# Patient Record
Sex: FEMALE | Race: OTHER | HISPANIC OR LATINO | URBAN - METROPOLITAN AREA
[De-identification: names, ages, dates, MRNs, and addresses within clinical notes are randomized per-mention and may not be internally consistent; named-entity substitution may affect disease eponyms.]

---

## 2018-03-12 ENCOUNTER — EMERGENCY (EMERGENCY)
Facility: HOSPITAL | Age: 58
LOS: 1 days | Discharge: HOME | End: 2018-03-12
Attending: EMERGENCY MEDICINE | Admitting: EMERGENCY MEDICINE
Payer: MEDICAID

## 2018-03-12 VITALS
TEMPERATURE: 98 F | RESPIRATION RATE: 18 BRPM | SYSTOLIC BLOOD PRESSURE: 175 MMHG | HEART RATE: 92 BPM | DIASTOLIC BLOOD PRESSURE: 107 MMHG

## 2018-03-12 VITALS
RESPIRATION RATE: 18 BRPM | HEART RATE: 89 BPM | OXYGEN SATURATION: 100 % | SYSTOLIC BLOOD PRESSURE: 156 MMHG | DIASTOLIC BLOOD PRESSURE: 89 MMHG

## 2018-03-12 DIAGNOSIS — Z98.890 OTHER SPECIFIED POSTPROCEDURAL STATES: Chronic | ICD-10-CM

## 2018-03-12 PROBLEM — Z00.00 ENCOUNTER FOR PREVENTIVE HEALTH EXAMINATION: Status: ACTIVE | Noted: 2018-03-12

## 2018-03-12 LAB
APTT BLD: 27.4 SEC — LOW (ref 27.5–37.4)
HCT VFR BLD CALC: 39.8 % — SIGNIFICANT CHANGE UP (ref 34.5–45)
HGB BLD-MCNC: 13.3 G/DL — SIGNIFICANT CHANGE UP (ref 11.5–15.5)
INR BLD: 0.98 RATIO — SIGNIFICANT CHANGE UP (ref 0.88–1.16)
MCHC RBC-ENTMCNC: 28.6 PG — SIGNIFICANT CHANGE UP (ref 27–34)
MCHC RBC-ENTMCNC: 33.4 GM/DL — SIGNIFICANT CHANGE UP (ref 32–36)
MCV RBC AUTO: 85.7 FL — SIGNIFICANT CHANGE UP (ref 80–100)
PLATELET # BLD AUTO: 171 K/UL — SIGNIFICANT CHANGE UP (ref 150–400)
PROTHROM AB SERPL-ACNC: 10.7 SEC — SIGNIFICANT CHANGE UP (ref 9.8–12.7)
RBC # BLD: 4.64 M/UL — SIGNIFICANT CHANGE UP (ref 3.8–5.2)
RBC # FLD: 12.9 % — SIGNIFICANT CHANGE UP (ref 10.3–14.5)
WBC # BLD: 5.2 K/UL — SIGNIFICANT CHANGE UP (ref 3.8–10.5)
WBC # FLD AUTO: 5.2 K/UL — SIGNIFICANT CHANGE UP (ref 3.8–10.5)

## 2018-03-12 PROCEDURE — 85610 PROTHROMBIN TIME: CPT

## 2018-03-12 PROCEDURE — 85730 THROMBOPLASTIN TIME PARTIAL: CPT

## 2018-03-12 PROCEDURE — 85027 COMPLETE CBC AUTOMATED: CPT

## 2018-03-12 PROCEDURE — 99284 EMERGENCY DEPT VISIT MOD MDM: CPT

## 2018-03-12 PROCEDURE — 99283 EMERGENCY DEPT VISIT LOW MDM: CPT

## 2018-03-12 RX ORDER — PHENYLEPHRINE HCL 0.25 %
1 AEROSOL, SPRAY WITH PUMP (ML) NASAL ONCE
Qty: 0 | Refills: 0 | Status: COMPLETED | OUTPATIENT
Start: 2018-03-12 | End: 2018-03-12

## 2018-03-12 RX ADMIN — Medication 1 SPRAY(S): at 19:22

## 2018-03-12 NOTE — ED PROVIDER NOTE - PROGRESS NOTE DETAILS
Pt seen at bedside, sitting and eating, nosebleed resolved.  Dizziness and lightheadedness resolved with food.  Nares patent, no active bleeding seen.  H/H and coags wnl.  Pt instructed to f/u with ENT.

## 2018-03-12 NOTE — ED PROVIDER NOTE - OBJECTIVE STATEMENT
58yo F w PMH of HTN, DM, osteoarthritis, eye cataracts, and HLD p/w recurrent epistaxis since last week.  Pt states occurred once on Wed, Sat, and Sun, and then this morning, 3 episodes, followed by 2 hours now.  Pt currently bleeding now.  Other episodes resolved spontaneously w some pressure within 15-20 minutes.  Pt states has had nosebleeds once 20 years ago, hx of menorrhagia, and hx of profuse bleeding s/p dental work, never worked up for bleeding.  Pt endorses dizziness and lightheadedness currently.  Pt states she is chronically SOB, denies CP, HA.    PMH: see above  PSH: hysterectomy  Meds: pravastatin, travatan, pilocarpin, unsure what she takes for DM possible metformin, micardis (telmisartan), trabatol? for htn, ASA occasionally, not in the past few days  Allergies: NKDA  Social: denies T/E/D 56yo F w PMH of HTN, DM, osteoarthritis, eye cataracts, and HLD p/w recurrent epistaxis since last week.  Pt states occurred once on Wed, Sat, and Sun, and then this morning, 3 episodes, followed by 2 hours now.  Pt currently bleeding now.  Other episodes resolved spontaneously w some pressure within 15-20 minutes.  Pt states has had nosebleeds once 20 years ago, hx of menorrhagia, and hx of profuse bleeding s/p dental work, never worked up for bleeding.  Pt endorses dizziness and lightheadedness currently.  Pt states she is chronically SOB, denies CP, HA.    PMH: see above  PSH: hysterectomy  Meds: pravastatin, travatan, pilocarpin, unsure what she takes for DM possible metformin, micardis (telmisartan), trabatol? for htn, ASA occasionally, not in the past few days  Allergies: NKDA  Social: denies T/E/D    Attendinyo female presents with nosebleed from the right nare for about 2 hours.  Pt has having intermittent nosebleeds for about 2 weeks.  Today was on a flight from [place].  No trauma.  no blood thinners.

## 2018-03-12 NOTE — ED PROVIDER NOTE - PLAN OF CARE
Please follow-up with your PCP and ENT within the week.  Please stay hydrated, avoid blowing your nose or trauma to the nose.  If nosebleeding reoccurs before your outpatient doctor's visit and holding pressure does not stop the bleeding, please use two sprays of the neosynephrine.  If bleeding continues, please call your PCP or go to the nearest emergency room.

## 2018-03-12 NOTE — ED ADULT TRIAGE NOTE - CHIEF COMPLAINT QUOTE
intermittent nosebleed x the passed few days, now bleeding constantly over the passed hour, not on any blood thinners, just arrived off flight from [place].

## 2018-03-12 NOTE — ED ADULT NURSE REASSESSMENT NOTE - NS ED NURSE REASSESS COMMENT FT1
Received report from [facility]. Pt sitting on assigned stretcher shows no signs of any distress, VS WNL. Awaiting proper disposition. Continue monitor.

## 2018-03-12 NOTE — ED PROVIDER NOTE - ENMT, MLM
Airway patent, minimal bright red blood from R nostril. Mouth with normal mucosa. Throat has no vesicles, no oropharyngeal exudates, no petechiae, and uvula is midline.

## 2018-03-12 NOTE — ED PROVIDER NOTE - CARE PLAN
Principal Discharge DX:	Epistaxis, recurrent  Assessment and plan of treatment:	Please follow-up with your PCP and ENT within the week.  Please stay hydrated, avoid blowing your nose or trauma to the nose.  If nosebleeding reoccurs before your outpatient doctor's visit and holding pressure does not stop the bleeding, please use two sprays of the neosynephrine.  If bleeding continues, please call your PCP or go to the nearest emergency room.

## 2018-03-12 NOTE — ED PROVIDER NOTE - MEDICAL DECISION MAKING DETAILS
A/P: 56yo w PMH of HTN, HLD, DM, osteoarthritis, eye cataracts, menorrhagia, and bleeding after dental surgery w no work-up presenting w epistaxis.  - hold pressure, ice pack, neosynephrine  - f/u CBC, coags, will continue to monitor    REENA Dave, PGY1

## 2018-03-12 NOTE — ED ADULT NURSE NOTE - OBJECTIVE STATEMENT
57 yr old female amb to ed c/o epistaxis rt nostril off and on since yesterday. Denies sob or chest pain Resp even and nonlab c/o dizziness. Cap refill wnl Conjunctiva pink Skin turg normal Denies abd pain Denies N/V Denies trauma Denies blood thinners

## 2019-08-04 ENCOUNTER — EMERGENCY (EMERGENCY)
Facility: HOSPITAL | Age: 59
LOS: 1 days | Discharge: HOME | End: 2019-08-04
Attending: EMERGENCY MEDICINE
Payer: MEDICAID

## 2019-08-04 VITALS
DIASTOLIC BLOOD PRESSURE: 84 MMHG | RESPIRATION RATE: 20 BRPM | OXYGEN SATURATION: 96 % | TEMPERATURE: 99 F | HEART RATE: 86 BPM | SYSTOLIC BLOOD PRESSURE: 149 MMHG

## 2019-08-04 VITALS
OXYGEN SATURATION: 95 % | SYSTOLIC BLOOD PRESSURE: 175 MMHG | RESPIRATION RATE: 22 BRPM | HEART RATE: 91 BPM | TEMPERATURE: 98 F | DIASTOLIC BLOOD PRESSURE: 77 MMHG

## 2019-08-04 DIAGNOSIS — Z98.890 OTHER SPECIFIED POSTPROCEDURAL STATES: Chronic | ICD-10-CM

## 2019-08-04 PROCEDURE — 99283 EMERGENCY DEPT VISIT LOW MDM: CPT

## 2019-08-04 PROCEDURE — 99282 EMERGENCY DEPT VISIT SF MDM: CPT

## 2019-08-04 PROCEDURE — 94640 AIRWAY INHALATION TREATMENT: CPT

## 2019-08-04 RX ORDER — FLUTICASONE PROPIONATE 50 MCG
2 SPRAY, SUSPENSION NASAL
Refills: 0 | Status: DISCONTINUED | OUTPATIENT
Start: 2019-08-04 | End: 2019-08-09

## 2019-08-04 RX ADMIN — Medication 2 SPRAY(S): at 21:57

## 2019-08-04 NOTE — ED ADULT TRIAGE NOTE - CHIEF COMPLAINT QUOTE
sinus congestion, sore throat, difficulty swallowing. took 1 benedryl 1 hour ago. no tongue swelling/facial edema. Hoarse voice noted.

## 2019-08-04 NOTE — ED PROVIDER NOTE - NSFOLLOWUPINSTRUCTIONS_ED_ALL_ED_FT
Follow up with your Primary Care Physician within the next 2-3 days  Bring a copy of your test results with you to your appointment  Continue your current medication regimen  Return to the Emergency Room if you experience new or worsening symptoms  use nasal spray in each nostril : 2 sprays each nostril 2x per day  Follow up with allergist

## 2019-08-04 NOTE — ED PROVIDER NOTE - NSFOLLOWUPCLINICS_GEN_ALL_ED_FT
[facility]  Allergy  [address] [place] [address]  Phone: [phone]  Fax:   Follow Up Time:

## 2019-08-04 NOTE — ED PROVIDER NOTE - ATTENDING CONTRIBUTION TO CARE
Attending MD Coley:   I personally have seen and examined this patient.  Physician assistant note reviewed and agree on plan of care and except where noted.  See below for details.     Seen in [facility]    Milind went to visit someone and she felt a strong smell at around 530pm.  Reports felt shortness of breath at that time.  Reports was drinking water and reports that if she did not drink water felt as if her throat was closing.  Milind sat in front of the AC and then feels like she started to sneeze.  Reports throat pain.  Milind was given Benadryl by someone at that time.  Reports also had a new fruit prior to going to visit this person was given to her my her , does not know what fruit (showed picture of jackfruit).  Denies sick contacts.  Denies recent travel.  Denies abdominal pain, nausea, vomiting, diarrhea, blood in stools. Denies dysuria, hematuria, change in urinary habits including frequency, urgency. Denies other allergy, reports "sometimes I'm allergy to air".  Denies medication or food allergy.  Denies rash.      TO BE COMPLETED Attending MD Coley:   I personally have seen and examined this patient.  Physician assistant note reviewed and agree on plan of care and except where noted.  See below for details.     Seen in [facility], accompanied by daughter, interviewed in Mongolian    58F with PMH/PSH including DM, HTN,  HLD, osteoarthritis presents to the ED with "an allergy".  Reports went to visit someone and she felt a strong smell at their place around 530pm.  Reports felt shortness of breath at that time.  Reports was drinking water and reports that if she did not drink water felt as if her throat was closing.  Reports sat in front of the AC and then feels like she started to sneeze.  Reports throat pain.  Reports was given Benadryl by someone at that time.  Reports also had a new fruit prior to going to visit this person was given to her my her , does not know what fruit (showed picture of jackfruit).  Daughter reports both she and patient suffer from chronic sinus issues and patient is supposed to have a procedure related to this.  When asked about the sound of patient's voice, reports sounds normal (triage and nursing note documented "hoarse voice").  Patient does not sound hoarse so this MD.  Denies sick contacts.  Denies recent travel.  Denies abdominal pain, nausea, vomiting, diarrhea, blood in stools. Denies dysuria, hematuria, change in urinary habits including frequency, urgency. Denies other allergy, reports "sometimes I'm allergic to air".  Denies medication or food allergy.  Denies rash.  On exam, NAD, head NCAT, PERRL, FROM at neck, no pharyngeal erythema, uvula midline without edema or erythema, no vesicles, no tongue swelling, ?mild edema of the L tonsil (vs baseline mildly larger than R), +audible nasal congestion, no tenderness to midline palpation, no stepoffs along length of spine, lungs CTAB with good inspiratory effort, no wheezing, no rhonchi, no rales, +S1S2, no m/r/g, abdomen soft with +BS, NT, ND, no CVAT, moving all extremities with 5/5 strength bilateral upper and lower extremities, good and equal  strength bilaterally, no rash noted, no calf tenderness, swelling, erythema or warmth; A/P: 58F with reported allergic reaction, however suspect this is sinus related, do not suspect allergic reaction at this time, will give Flonase, recommended ENT follow up and follow up with allergist.  Verbalized understanding.  Follow up instructions given, importance of follow up emphasized, return to ED parameters reviewed and patient verbalized understanding.  All questions answered, all concerns addressed.

## 2019-08-04 NOTE — ED PROVIDER NOTE - ENMT, MLM
Airway patent, Nasal mucosa clear. Mouth with normal mucosa. Throat has no vesicles, no oropharyngeal exudates and uvula is midline. left tonsil minimally enlarged without erythema. patient with significant nasal congestion

## 2019-08-04 NOTE — ED ADULT NURSE NOTE - OBJECTIVE STATEMENT
59 y/o female a+ox3, pmhx DM, HTN, coming from home for allergic reaction. Onset of sinus pressure earlier today; throat began to "feel tight"; unknown dose of benadryl. Upon arrival to ED pt denies difficulty breathing, tongue swelling, headache, lightheadedness, dizziness, abdominal pain, n/v/d, fever or chills, chest pain or discomfort. Daughter at bedside states the pt is "supposed to have a procedure to alleviate her chronic sinus infections." MD Coley at bedside to assess. Pt left in position of comfort, will reassess

## 2019-08-05 PROBLEM — I10 ESSENTIAL (PRIMARY) HYPERTENSION: Chronic | Status: ACTIVE | Noted: 2018-03-12

## 2019-08-05 PROBLEM — E11.9 TYPE 2 DIABETES MELLITUS WITHOUT COMPLICATIONS: Chronic | Status: ACTIVE | Noted: 2018-03-12

## 2019-09-01 ENCOUNTER — OUTPATIENT (OUTPATIENT)
Dept: OUTPATIENT SERVICES | Facility: HOSPITAL | Age: 59
LOS: 1 days | End: 2019-09-01
Payer: MEDICAID

## 2019-09-01 DIAGNOSIS — Z98.890 OTHER SPECIFIED POSTPROCEDURAL STATES: Chronic | ICD-10-CM

## 2019-09-01 PROCEDURE — G9001: CPT

## 2019-09-09 DIAGNOSIS — Z71.89 OTHER SPECIFIED COUNSELING: ICD-10-CM

## 2019-10-29 NOTE — ED PROVIDER NOTE - CPE EDP NEURO NORM
Called patient regarding EGD order.    No answer and message was left for her to return call to Outpatient Endoscopy Scheduling at [phone]  
normal...

## 2024-07-19 ENCOUNTER — APPOINTMENT (OUTPATIENT)
Dept: ORTHOPEDIC SURGERY | Facility: CLINIC | Age: 64
End: 2024-07-19
Payer: MEDICARE

## 2024-07-19 VITALS
HEIGHT: 63 IN | WEIGHT: 230 LBS | SYSTOLIC BLOOD PRESSURE: 147 MMHG | BODY MASS INDEX: 40.75 KG/M2 | DIASTOLIC BLOOD PRESSURE: 87 MMHG | HEART RATE: 93 BPM

## 2024-07-19 DIAGNOSIS — G25.2 OTHER SPECIFIED FORMS OF TREMOR: ICD-10-CM

## 2024-07-19 DIAGNOSIS — M67.951 UNSPECIFIED DISORDER OF SYNOVIUM AND TENDON, RIGHT THIGH: ICD-10-CM

## 2024-07-19 DIAGNOSIS — M67.952 UNSPECIFIED DISORDER OF SYNOVIUM AND TENDON, LEFT THIGH: ICD-10-CM

## 2024-07-19 DIAGNOSIS — M77.8 OTHER ENTHESOPATHIES, NOT ELSEWHERE CLASSIFIED: ICD-10-CM

## 2024-07-19 DIAGNOSIS — M70.62 TROCHANTERIC BURSITIS, LEFT HIP: ICD-10-CM

## 2024-07-19 DIAGNOSIS — M47.812 SPONDYLOSIS W/OUT MYELOPATHY OR RADICULOPATHY, CERVICAL REGION: ICD-10-CM

## 2024-07-19 DIAGNOSIS — Z86.79 PERSONAL HISTORY OF OTHER DISEASES OF THE CIRCULATORY SYSTEM: ICD-10-CM

## 2024-07-19 DIAGNOSIS — Z86.39 PERSONAL HISTORY OF OTHER ENDOCRINE, NUTRITIONAL AND METABOLIC DISEASE: ICD-10-CM

## 2024-07-19 DIAGNOSIS — M43.06 SPONDYLOLYSIS, LUMBAR REGION: ICD-10-CM

## 2024-07-19 PROCEDURE — 72100 X-RAY EXAM L-S SPINE 2/3 VWS: CPT

## 2024-07-19 PROCEDURE — 72040 X-RAY EXAM NECK SPINE 2-3 VW: CPT

## 2024-07-19 PROCEDURE — 99204 OFFICE O/P NEW MOD 45 MIN: CPT

## 2024-07-19 RX ORDER — LOSARTAN POTASSIUM 100 MG/1
TABLET, FILM COATED ORAL
Refills: 0 | Status: ACTIVE | COMMUNITY

## 2024-07-19 RX ORDER — APIXABAN 5 MG/1
TABLET, FILM COATED ORAL
Refills: 0 | Status: ACTIVE | COMMUNITY

## 2024-07-19 RX ORDER — METFORMIN HYDROCHLORIDE 625 MG/1
TABLET ORAL
Refills: 0 | Status: ACTIVE | COMMUNITY